# Patient Record
(demographics unavailable — no encounter records)

---

## 2024-10-23 NOTE — DISCUSSION/SUMMARY
[Normal Growth] : growth [Normal Development] : development [None] : No known medical problems [No Elimination Concerns] : elimination [No Feeding Concerns] : feeding [No Skin Concerns] : skin [Normal Sleep Pattern] : sleep [Family Support] : family support [Establishing Routines] : establishing routines [Feeding and Appetite Changes] : feeding and appetite changes [Establishing A Dental Home] : establishing a dental home [Safety] : safety [No Medications] : ~He/She~ is not on any medications [Parent/Guardian] : parent/guardian [] : The components of the vaccine(s) to be administered today are listed in the plan of care. The disease(s) for which the vaccine(s) are intended to prevent and the risks have been discussed with the caretaker.  The risks are also included in the appropriate vaccination information statements which have been provided to the patient's caregiver.  The caregiver has given consent to vaccinate. [FreeTextEntry1] : FAMILY SUPPORT: Discussed adjustments to the child's developmental changes and behavior, family-work balance, parental agreement/disagreement about child issues.  ESTABLISHING ROUTINES: Discussed family time, bedtime, tooth brushing, nap times.  FEEDING AND APPETITE CHANGES: Discussed self-feeding, nutritious foods, choices, "grazing".  DENTAL HEALTH: Discussed establishing a dental home. First dental checkup, dental hygiene.   SAFETY:  Discussed home safety, car safety seats, drowning, guns. Smoke and carbon monoxide monitors stressed.  Lead exposure discussed. Return 1 month for Flu 2

## 2024-10-23 NOTE — HISTORY OF PRESENT ILLNESS
[Parents] : parents [Normal] : Normal [Vitamin] : Primary Fluoride Source: Vitamin [Playtime] : Playtime  [No] : No cigarette smoke exposure [Water heater temperature set at <120 degrees F] : Water heater temperature set at <120 degrees F [Car seat in back seat] : Car seat in back seat [Smoke Detectors] : Smoke detectors [Carbon Monoxide Detectors] : Carbon monoxide detectors [Up to date] : Up to date [At risk for exposure to TB] : Not at risk for exposure to Tuberculosis [FreeTextEntry7] : 12 month well [LastFluoridetreatment] : NA [FreeTextEntry1] : Doing well at home Feeding well, no vomiting, no diarrhea Sleeping well Adequate BMS, yellow and seedy Urinating well Parents have no issues or concerns

## 2024-10-23 NOTE — DEVELOPMENTAL MILESTONES
[Normal Development] : Normal Development [None] : none [FreeTextEntry1] : GM - 14.2 FMA - 13.3 PS - 14 L -13.1

## 2024-10-23 NOTE — PHYSICAL EXAM
[Alert] : alert [Normocephalic] : normocephalic [Closed Anterior [place]] : closed anterior fontanelle [Red Reflex] : red reflex bilateral [PERRL] : PERRL [Normally Placed Ears] : normally placed ears [Auricles Well Formed] : auricles well formed [Clear Tympanic membranes] : clear tympanic membranes [Light reflex present] : light reflex present [Bony landmarks visible] : bony landmarks visible [Nares Patent] : nares patent [Palate Intact] : palate intact [Uvula Midline] : uvula midline [Tooth Eruption] : tooth eruption [Supple, full passive range of motion] : supple, full passive range of motion [Symmetric Chest Rise] : symmetric chest rise [Clear to Auscultation Bilaterally] : clear to auscultation bilaterally [Regular Rate and Rhythm] : regular rate and rhythm [S1, S2 present] : S1, S2 present [+2 Femoral Pulses] : (+) 2 femoral pulses [Soft] : soft [Bowel Sounds] : normoactive bowel sounds [Normal External Genitalia] : normal external genitalia [Normal Vaginal Introitus] : normal vaginal introitus [No Abnormal Lymph Nodes Palpated] : no abnormal lymph nodes palpated [Symmetric Abduction and Rotation of Hips] : symmetric abduction and rotation of hips [Straight] : straight [Cranial Nerves Grossly Intact] : cranial nerves grossly intact [Discharge] : no discharge [Palpable Masses] : no palpable masses [Murmurs] : no murmurs [Tender] : nontender [Distended] : nondistended [Hepatomegaly] : no hepatomegaly [Splenomegaly] : no splenomegaly [Clitoromegaly] : no clitoromegaly [Allis Sign] : negative Allis sign [Rash or Lesions] : no rash/lesions

## 2025-01-06 NOTE — HISTORY OF PRESENT ILLNESS
[de-identified] : cough, congestion, fever x 1 week [FreeTextEntry6] : Fever x 5 days Cough and nasal congestion x 6 days Has had some episodes of faster breathing, more at night appetite slightly decreased, + fluids Normal UOP Vomiting after cough a few times, last was yesterday, No diarrhea No known covid contacts

## 2025-01-06 NOTE — DISCUSSION/SUMMARY
[FreeTextEntry1] : Symptomatic treatment of fever and/or pain discussed Start medication as instructed Ibuprofen for pain Hydrate well Handwashing and infection control discussed Follow up 2 weeks, sooner if symptoms persist/worsen or fevers persist

## 2025-04-21 NOTE — PHYSICAL EXAM

## 2025-04-21 NOTE — HISTORY OF PRESENT ILLNESS
[Normal] : Normal [Vitamin] : Primary Fluoride Source: Vitamin [No] : No cigarette smoke exposure [Water heater temperature set at <120 degrees F] : Water heater temperature set at <120 degrees F [Car seat in back seat] : Car seat in back seat [Carbon Monoxide Detectors] : Carbon monoxide detectors [Smoke Detectors] : Smoke detectors [Up to date] : Up to date [Mother] : mother [Brushing teeth] : Brushing teeth [FreeTextEntry7] : 18 month WCC [de-identified] : getting a little picky, doesn't like to sit for meals - mostly good variety of foods [LastFluorideTreatment] : n/a

## 2025-04-21 NOTE — HISTORY OF PRESENT ILLNESS
[Normal] : Normal [Vitamin] : Primary Fluoride Source: Vitamin [No] : No cigarette smoke exposure [Water heater temperature set at <120 degrees F] : Water heater temperature set at <120 degrees F [Car seat in back seat] : Car seat in back seat [Carbon Monoxide Detectors] : Carbon monoxide detectors [Smoke Detectors] : Smoke detectors [Up to date] : Up to date [Mother] : mother [Brushing teeth] : Brushing teeth [FreeTextEntry7] : 18 month WCC [de-identified] : getting a little picky, doesn't like to sit for meals - mostly good variety of foods [LastFluorideTreatment] : n/a

## 2025-04-21 NOTE — DISCUSSION/SUMMARY
[Normal Growth] : growth [Normal Development] : development [None] : No known medical problems [No Elimination Concerns] : elimination [No Feeding Concerns] : feeding [No Skin Concerns] : skin [Normal Sleep Pattern] : sleep [Communication and Social Development] : communication and social development [Sleep Routines and Issues] : sleep routines and issues [Temper Tantrums and Discipline] : temper tantrums and discipline [Healthy Teeth] : healthy teeth [No Medications] : ~He/She~ is not on any medications [Parent/Guardian] : parent/guardian [] : The components of the vaccine(s) to be administered today are listed in the plan of care. The disease(s) for which the vaccine(s) are intended to prevent and the risks have been discussed with the caretaker.  The risks are also included in the appropriate vaccination information statements which have been provided to the patient's caregiver.  The caregiver has given consent to vaccinate. [Family Support] : family support [Child Development and Behavior] : child development and behavior [Language Promotion/Hearing] : language promotion/hearing [Toliet Training Readiness] : toliet training readiness [Safety] : safety [FreeTextEntry1] : FAMILY SUPPORT: Discussed parental well-being, adjustment to toddler's growing independence and occasional negativity, queries about a new sibling planned or on the way.  DEVELOPMENT/BEHAVIOR: Discussed child development and behavior, adaptation to non-parental care and anticipation of return to clinging, other changes connected with new cognitive gains.   LANGUAGE PROMOTION/HEARING: Discussed encouragement of language, use of simple words and phrases, engagement in reading/singing/talking.  TOILET TRAINING: Discussed toilet training readiness (recognizing signs of readiness, parental expectations).  SAFETY: Discussed car safety seats, parental use of safety belts, falls, fires, and burns; poisoning; guns. Smoke and carbon monoxide monitors stressed.  Lead exposure discussed.  Next visit at 2 years

## 2025-04-21 NOTE — PHYSICAL EXAM
